# Patient Record
Sex: MALE | Race: WHITE | NOT HISPANIC OR LATINO | Employment: FULL TIME | URBAN - METROPOLITAN AREA
[De-identification: names, ages, dates, MRNs, and addresses within clinical notes are randomized per-mention and may not be internally consistent; named-entity substitution may affect disease eponyms.]

---

## 2024-06-24 ENCOUNTER — HOSPITAL ENCOUNTER (EMERGENCY)
Facility: HOSPITAL | Age: 44
Discharge: HOME | End: 2024-06-24
Attending: FAMILY MEDICINE | Admitting: FAMILY MEDICINE
Payer: COMMERCIAL

## 2024-06-24 ENCOUNTER — APPOINTMENT (OUTPATIENT)
Facility: HOSPITAL | Age: 44
End: 2024-06-24
Payer: COMMERCIAL

## 2024-06-24 VITALS
WEIGHT: 205 LBS | OXYGEN SATURATION: 98 % | HEIGHT: 68 IN | BODY MASS INDEX: 31.07 KG/M2 | RESPIRATION RATE: 16 BRPM | SYSTOLIC BLOOD PRESSURE: 143 MMHG | TEMPERATURE: 97.9 F | DIASTOLIC BLOOD PRESSURE: 76 MMHG | HEART RATE: 62 BPM

## 2024-06-24 DIAGNOSIS — M54.2 CERVICALGIA: ICD-10-CM

## 2024-06-24 DIAGNOSIS — M54.12 CERVICAL RADICULOPATHY: Primary | ICD-10-CM

## 2024-06-24 PROCEDURE — 99284 EMERGENCY DEPT VISIT MOD MDM: CPT

## 2024-06-24 PROCEDURE — 72125 CT NECK SPINE W/O DYE: CPT

## 2024-06-24 RX ORDER — DIAZEPAM 5 MG/1
10 TABLET ORAL ONCE
Status: COMPLETED | OUTPATIENT
Start: 2024-06-24 | End: 2024-06-24

## 2024-06-24 RX ORDER — FENOFIBRATE 160 MG/1
160 TABLET ORAL DAILY
COMMUNITY

## 2024-06-24 RX ORDER — CYCLOBENZAPRINE HCL 10 MG
10 TABLET ORAL 3 TIMES DAILY PRN
Qty: 12 TABLET | Refills: 0 | Status: SHIPPED | OUTPATIENT
Start: 2024-06-24

## 2024-06-24 RX ORDER — ATORVASTATIN CALCIUM 40 MG/1
40 TABLET, FILM COATED ORAL DAILY
COMMUNITY

## 2024-06-24 RX ORDER — PANTOPRAZOLE SODIUM 40 MG/1
40 TABLET, DELAYED RELEASE ORAL DAILY
COMMUNITY

## 2024-06-24 RX ORDER — ALLOPURINOL 300 MG/1
300 TABLET ORAL DAILY
COMMUNITY

## 2024-06-24 RX ORDER — PREDNISONE 20 MG/1
60 TABLET ORAL DAILY
Qty: 15 TABLET | Refills: 0 | Status: SHIPPED | OUTPATIENT
Start: 2024-06-24 | End: 2024-06-29

## 2024-06-24 RX ADMIN — DIAZEPAM 10 MG: 5 TABLET ORAL at 07:51

## 2024-06-24 NOTE — FSED PROVIDER NOTE
Subjective  History of Present Illness:    43-year-old male presenting today for evaluation of left-sided neck pain radiating to his left arm.  Patient reports that this began approximately 2 to 3 days ago.  Patient reports that on Thursday or Friday he was carrying around a large heavy hot box and he started having pain the next day.  Patient denies loss of consciousness, injury, fever, chills, malaise, headache, shortness of breath, cough, runny nose, chest pain, nausea, vomiting, diarrhea, abdominal pain, weight gain, weight loss or dysuria      Nurses Notes reviewed and agree, including vitals, allergies, social history and prior medical history.     REVIEW OF SYSTEMS: All systems reviewed and not pertinent unless noted.  Review of Systems   Constitutional: Negative.  Negative for chills, fatigue and fever.   HENT: Negative.  Negative for ear pain, rhinorrhea and sinus pressure.    Respiratory: Negative.  Negative for cough and shortness of breath.    Cardiovascular: Negative.  Negative for chest pain and palpitations.   Gastrointestinal: Negative.  Negative for abdominal pain, diarrhea, nausea and vomiting.   Musculoskeletal:  Positive for back pain, myalgias, neck pain and neck stiffness.   Neurological: Negative.  Negative for dizziness, syncope, weakness, light-headedness and headaches.   Psychiatric/Behavioral: Negative.  Negative for agitation.    All other systems reviewed and are negative.      Past Medical History:   Diagnosis Date    Hyperlipidemia        Allergies:    Patient has no known allergies.      History reviewed. No pertinent surgical history.      Social History     Socioeconomic History    Marital status: Single   Tobacco Use    Smoking status: Never   Vaping Use    Vaping status: Never Used   Substance and Sexual Activity    Alcohol use: Never    Drug use: Never    Sexual activity: Defer         History reviewed. No pertinent family history.    Objective  Physical Exam:  /76 (BP  "Location: Left arm)   Pulse 62   Temp 97.9 °F (36.6 °C)   Resp 16   Ht 172.7 cm (68\")   Wt 93 kg (205 lb)   SpO2 98%   BMI 31.17 kg/m²      Physical Exam  Vitals and nursing note reviewed.   Constitutional:       Appearance: Normal appearance. He is normal weight.   HENT:      Head: Normocephalic and atraumatic.      Right Ear: Tympanic membrane, ear canal and external ear normal.      Left Ear: Tympanic membrane, ear canal and external ear normal.      Nose: Nose normal.      Mouth/Throat:      Mouth: Mucous membranes are moist.      Pharynx: Oropharynx is clear.   Eyes:      Extraocular Movements: Extraocular movements intact.      Conjunctiva/sclera: Conjunctivae normal.      Pupils: Pupils are equal, round, and reactive to light.   Neck:     Cardiovascular:      Rate and Rhythm: Normal rate and regular rhythm.      Pulses: Normal pulses.      Heart sounds: Normal heart sounds.   Pulmonary:      Effort: Pulmonary effort is normal.      Breath sounds: Normal breath sounds.   Abdominal:      General: Abdomen is flat. Bowel sounds are normal.      Palpations: Abdomen is soft.   Musculoskeletal:         General: Normal range of motion.      Cervical back: Normal range of motion and neck supple. No rigidity. Pain with movement present. Normal range of motion.   Skin:     General: Skin is warm and dry.      Capillary Refill: Capillary refill takes less than 2 seconds.   Neurological:      General: No focal deficit present.      Mental Status: He is alert and oriented to person, place, and time. Mental status is at baseline.   Psychiatric:         Mood and Affect: Mood normal.         Behavior: Behavior normal.         Thought Content: Thought content normal.         Judgment: Judgment normal.         Procedures    ED Course:         Lab Results (last 24 hours)       ** No results found for the last 24 hours. **             CT Cervical Spine Without Contrast    Result Date: 6/24/2024  CT CERVICAL SPINE WO " CONTRAST Date of Exam: 6/24/2024 8:04 AM EDT Indication: pain/numbness. Neck pain Comparison: None available. Technique: Axial CT images were obtained of the cervical spine without contrast administration.  Reconstructed coronal and sagittal images were also obtained. Automated exposure control and iterative construction methods were used. Findings: There are normal vertebral body heights. There are straightening of curvature with mild reversal of curvature centered at C3-4. There is mild narrowing of the C3-4 through C6/7 disc interspaces. There are nonunited osteophytes versus old avulsions anteriorly at C4-5 and C6/7. There are no findings suspicious for acute fracture. There is narrowing of the atlantoaxial articulation with mild to moderate spurring. There is moderately severe left neural foraminal stenosis at C2-3. There is facet joint hypertrophy with severe left neural foraminal stenosis at C3-4 and to a lesser extent at C4-5. There is mild to moderate left neural foraminal stenosis at C5-6 and C6/7. There is no significant spinal stenosis.    Impression: Impression: Chronic findings as detailed. No acute process. Electronically Signed: Mena Ramírez MD  6/24/2024 8:20 AM EDT  Workstation ID: [id number]        [facility]     Amount and/or Complexity of Data Reviewed  Tests in the radiology section of CPT®: reviewed        Initial impression of presenting illness: 43-year-old male presenting today for evaluation of left-sided neck pain radiating to the left arm and back    DDX: includes but is not limited to: Muscle spasm, nerve impingement, occult fracture    Patient arrives ambulatory, afebrile, normotensive with heart rate 62 with vitals interpreted by myself.     Pertinent features from physical exam: Muscle spasm and tenderness palpation noted left neck and trapezius with cervical tenderness.    Initial diagnostic plan: CT cervical spine    Results from initial plan were reviewed and interpreted by me revealing  cervical arthropathy    Diagnostic information from other sources: None    Interventions / Re-evaluation: Patient given Valium with moderate effect    Medications   diazePAM (VALIUM) tablet 10 mg (10 mg Oral Given 6/24/24 5360)       Results/clinical rationale were discussed with patient and wife    Consultations/Discussion of results with other physicians: None    Data interpreted: Nursing notes reviewed, vital signs reviewed.  Imaging independently interpreted by me (x-ray, CT scan).O2 saturation: 98% room air    Counseling: Discussed the results above with the patient regarding need for admission or discharge.  Patient understands and agrees plan of care.      -----  ED Disposition       ED Disposition   Discharge    Condition   Stable    Comment   --             Final diagnoses:   Cervical radiculopathy   Cervicalgia      Your Follow-Up Providers       Provider, No Known. Schedule an appointment as soon as possible for a visit in 3 days.    Follow up details: If symptoms worsen, For further evaluation  [facility] [id number]                       Contact information for after-discharge care    Follow-up information has not been specified.                    Your medication list        START taking these medications        Instructions Last Dose Given Next Dose Due   cyclobenzaprine 10 MG tablet  Commonly known as: FLEXERIL      Take 1 tablet by mouth 3 (Three) Times a Day As Needed for Muscle Spasms.       diclofenac 50 MG EC tablet  Commonly known as: VOLTAREN      Take 1 tablet by mouth 3 (Three) Times a Day.       predniSONE 20 MG tablet  Commonly known as: DELTASONE      Take 3 tablets by mouth Daily for 5 days.              CONTINUE taking these medications        Instructions Last Dose Given Next Dose Due   allopurinol 300 MG tablet  Commonly known as: ZYLOPRIM      Take 1 tablet by mouth Daily.       atorvastatin 40 MG tablet  Commonly known as: LIPITOR      Take 1 tablet by mouth  Daily.       fenofibrate 160 MG tablet      Take 1 tablet by mouth Daily.       pantoprazole 40 MG EC tablet  Commonly known as: PROTONIX      Take 1 tablet by mouth Daily.                 Where to Get Your Medications        These medications were sent to [id number] - [place], [place] - [address] - [phone]  - [phone] FX  [address] [facility] [address]      Phone: [phone]   cyclobenzaprine 10 MG tablet  diclofenac 50 MG EC tablet  predniSONE 20 MG tablet

## 2024-06-24 NOTE — Clinical Note
[facility] EM[facility] [place]  [address]  [facility] [address]  Phone: [phone]  Fax: [phone]    Black Gibson was seen and treated in our emergency department on 6/24/2024.  He may return to work on 06/27/2024.         Thank you for choosing [facility].    Galdino Obrien MD